# Patient Record
Sex: MALE | Race: WHITE
[De-identification: names, ages, dates, MRNs, and addresses within clinical notes are randomized per-mention and may not be internally consistent; named-entity substitution may affect disease eponyms.]

---

## 2018-08-31 ENCOUNTER — HOSPITAL ENCOUNTER (EMERGENCY)
Dept: HOSPITAL 96 - M.ERS | Age: 38
Discharge: HOME | End: 2018-08-31
Payer: COMMERCIAL

## 2018-08-31 VITALS — DIASTOLIC BLOOD PRESSURE: 90 MMHG | SYSTOLIC BLOOD PRESSURE: 134 MMHG

## 2018-08-31 VITALS — HEIGHT: 71 IN | WEIGHT: 200 LBS | BODY MASS INDEX: 28 KG/M2

## 2018-08-31 DIAGNOSIS — G43.909: Primary | ICD-10-CM

## 2018-08-31 DIAGNOSIS — J45.909: ICD-10-CM

## 2018-08-31 DIAGNOSIS — I10: ICD-10-CM

## 2018-08-31 DIAGNOSIS — Z88.7: ICD-10-CM

## 2018-09-11 ENCOUNTER — HOSPITAL ENCOUNTER (EMERGENCY)
Dept: HOSPITAL 96 - M.ERS | Age: 38
Discharge: HOME | End: 2018-09-11
Payer: COMMERCIAL

## 2018-09-11 VITALS — DIASTOLIC BLOOD PRESSURE: 88 MMHG | SYSTOLIC BLOOD PRESSURE: 145 MMHG

## 2018-09-11 VITALS — HEIGHT: 71 IN | WEIGHT: 205.01 LBS | BODY MASS INDEX: 28.7 KG/M2

## 2018-09-11 DIAGNOSIS — I10: ICD-10-CM

## 2018-09-11 DIAGNOSIS — F17.210: ICD-10-CM

## 2018-09-11 DIAGNOSIS — Z88.7: ICD-10-CM

## 2018-09-11 DIAGNOSIS — J45.909: ICD-10-CM

## 2018-09-11 DIAGNOSIS — R51: Primary | ICD-10-CM

## 2019-09-29 ENCOUNTER — HOSPITAL ENCOUNTER (EMERGENCY)
Dept: HOSPITAL 96 - M.ERS | Age: 39
Discharge: HOME | End: 2019-09-29
Payer: COMMERCIAL

## 2019-09-29 VITALS — HEIGHT: 71 IN | WEIGHT: 205.01 LBS | BODY MASS INDEX: 28.7 KG/M2

## 2019-09-29 VITALS — SYSTOLIC BLOOD PRESSURE: 129 MMHG | DIASTOLIC BLOOD PRESSURE: 72 MMHG

## 2019-09-29 DIAGNOSIS — G43.909: ICD-10-CM

## 2019-09-29 DIAGNOSIS — E11.9: Primary | ICD-10-CM

## 2019-09-29 DIAGNOSIS — R07.9: ICD-10-CM

## 2019-09-29 DIAGNOSIS — F17.210: ICD-10-CM

## 2019-09-29 DIAGNOSIS — J45.909: ICD-10-CM

## 2019-09-29 DIAGNOSIS — I10: ICD-10-CM

## 2019-09-29 DIAGNOSIS — Z88.7: ICD-10-CM

## 2019-09-29 LAB
ABSOLUTE BASOPHILS: 0 THOU/UL (ref 0–0.2)
ABSOLUTE EOSINOPHILS: 0.1 THOU/UL (ref 0–0.7)
ABSOLUTE MONOCYTES: 0.5 THOU/UL (ref 0–1.2)
ALBUMIN SERPL-MCNC: 3.5 G/DL (ref 3.4–5)
ALP SERPL-CCNC: 112 U/L (ref 46–116)
ALT SERPL-CCNC: 74 U/L (ref 30–65)
ANION GAP SERPL CALC-SCNC: 9 MMOL/L (ref 7–16)
AST SERPL-CCNC: 28 U/L (ref 15–37)
BASOPHILS NFR BLD AUTO: 0.9 %
BILIRUB SERPL-MCNC: 0.3 MG/DL
BILIRUB UR-MCNC: NEGATIVE MG/DL
BUN SERPL-MCNC: 20 MG/DL (ref 7–18)
CALCIUM SERPL-MCNC: 8.4 MG/DL (ref 8.5–10.1)
CHLORIDE SERPL-SCNC: 95 MMOL/L (ref 98–107)
CO2 SERPL-SCNC: 24 MMOL/L (ref 21–32)
COLOR UR: YELLOW
CREAT SERPL-MCNC: 1.1 MG/DL (ref 0.6–1.3)
EOSINOPHIL NFR BLD: 1.8 %
GLUCOSE SERPL-MCNC: 514 MG/DL (ref 70–99)
GRANULOCYTES NFR BLD MANUAL: 52.6 %
HCT VFR BLD CALC: 42.1 % (ref 42–52)
HGB BLD-MCNC: 14.8 GM/DL (ref 14–18)
KETONES UR STRIP-MCNC: (no result) MG/DL
LIPASE: 173 U/L (ref 73–393)
LYMPHOCYTES # BLD: 1.8 THOU/UL (ref 0.8–5.3)
LYMPHOCYTES NFR BLD AUTO: 34.6 %
MAGNESIUM SERPL-MCNC: 1.9 MG/DL (ref 1.8–2.4)
MCH RBC QN AUTO: 30.9 PG (ref 26–34)
MCHC RBC AUTO-ENTMCNC: 35.1 G/DL (ref 28–37)
MCV RBC: 87.9 FL (ref 80–100)
MONOCYTES NFR BLD: 10.1 %
MPV: 11.1 FL. (ref 7.2–11.1)
NEUTROPHILS # BLD: 2.7 THOU/UL (ref 1.6–8.1)
NUCLEATED RBCS: 0 /100WBC
PLATELET COUNT*: 140 THOU/UL (ref 150–400)
POTASSIUM SERPL-SCNC: 4.6 MMOL/L (ref 3.5–5.1)
PROT SERPL-MCNC: 6.8 G/DL (ref 6.4–8.2)
PROT UR QL STRIP: NEGATIVE
RBC # BLD AUTO: 4.79 MIL/UL (ref 4.5–6)
RBC # UR STRIP: NEGATIVE /UL
RDW-CV: 13 % (ref 10.5–14.5)
SODIUM SERPL-SCNC: 128 MMOL/L (ref 136–145)
SP GR UR STRIP: <= 1.005 (ref 1–1.03)
TROPONIN-I LEVEL: <0.06 NG/ML (ref ?–0.06)
URINE CLARITY: CLEAR
URINE GLUCOSE-RANDOM: (no result)
URINE LEUKOCYTES-REFLEX: NEGATIVE
URINE NITRITE-REFLEX: NEGATIVE
UROBILINOGEN UR STRIP-ACNC: 0.2 E.U./DL (ref 0.2–1)
WBC # BLD AUTO: 5.1 THOU/UL (ref 4–11)

## 2019-09-30 NOTE — EKG
[place] [address]
Phone:  [phone]                     ELECTROCARDIOGRAM REPORT      
_______________________________________________________________________________
 
Name:       ZULEMA BRIGHT               [id number]      Account #:      [id number]  
Admission:  19     Attend Phys:                         
Discharge:  19     Date of Birth:  [birth year]  
         Report #: [id number]
    [id number]
_______________________________________________________________________________
THIS REPORT FOR:  //name//                      
 
                         [facility] ED
                                       
Test Date:    2019               Test Time:    12:20:12
Pat Name:     ZULEMA SALAZARLEY           Department:   
Patient ID:   [id number]            Room:          
Gender:       M                        Technician:   KF
:          1980               Requested By: Luis Alberto Roque
Order Number: [id number] MD:   Troy Dow
                                 Measurements
Intervals                              Axis          
Rate:         87                       P:            23
OK:           143                      QRS:          27
QRSD:         85                       T:            21
QT:           339                                    
QTc:          408                                    
                           Interpretive Statements
Sinus rhythm
Baseline wander in lead(s) V2,V3,V4,V5,V6
 
 
Electronically Signed On 2019 11:15:09 CDT by Troy Dow
[email or web address]
 
 
 
 
 
 
 
 
 
 
 
 
 
 
 
 
 
 
 
  <ELECTRONICALLY SIGNED>
                                           By: Troy Dow MD, [facility]      
  19     1115
D: 19 1220   _____________________________________
T: 19 1220   Troy Dow MD, FACC        /EPI

## 2019-09-30 NOTE — EKG
[place] [address]
Phone:  [phone]                     ELECTROCARDIOGRAM REPORT      
_______________________________________________________________________________
 
Name:       ZULEMA BRIGHT               Room:                      [facility]#:  [id number]      Account #:      [id number]  
Admission:  19     Attend Phys:                         
Discharge:  19     Date of Birth:  [birth year]  
         Report #: [id number]
    [id number]
_______________________________________________________________________________
THIS REPORT FOR:  //name//                      
 
                         [facility] ED
                                       
Test Date:    2019               Test Time:    10:12:53
Pat Name:     ZULEMA BRIGHT           Department:   
Patient ID:   [id number]            Room:          
Gender:       M                        Technician:   SUE
:          1980               Requested By: Luis Alberto Roque
Order Number: [id number] MD:   Troy Dow
                                 Measurements
Intervals                              Axis          
Rate:         75                       P:            50
NH:           154                      QRS:          41
QRSD:         98                       T:            37
QT:           377                                    
QTc:          421                                    
                           Interpretive Statements
Sinus rhythm
ST elev, probable normal early repol pattern
Baseline wander in lead(s) V2
No previous ECG available for comparison
 
Electronically Signed On 2019 11:14:29 CDT by Troy Dow
[email or web address]
 
 
 
 
 
 
 
 
 
 
 
 
 
 
 
 
 
 
  <ELECTRONICALLY SIGNED>
                                           By: Troy Dow MD, [facility]      
  19     1114
D: 092   _____________________________________
T: 19 1012   Troy Dow MD, FACC        /EPI